# Patient Record
Sex: FEMALE | Race: WHITE
[De-identification: names, ages, dates, MRNs, and addresses within clinical notes are randomized per-mention and may not be internally consistent; named-entity substitution may affect disease eponyms.]

---

## 2017-08-24 ENCOUNTER — HOSPITAL ENCOUNTER (EMERGENCY)
Dept: HOSPITAL 54 - ER | Age: 52
Discharge: HOME | End: 2017-08-24
Payer: SELF-PAY

## 2017-08-24 VITALS — WEIGHT: 165 LBS | HEIGHT: 60 IN | BODY MASS INDEX: 32.39 KG/M2

## 2017-08-24 VITALS — DIASTOLIC BLOOD PRESSURE: 92 MMHG | SYSTOLIC BLOOD PRESSURE: 148 MMHG

## 2017-08-24 DIAGNOSIS — Z88.8: ICD-10-CM

## 2017-08-24 DIAGNOSIS — Z88.5: ICD-10-CM

## 2017-08-24 DIAGNOSIS — E11.9: ICD-10-CM

## 2017-08-24 DIAGNOSIS — I10: ICD-10-CM

## 2017-08-24 DIAGNOSIS — Z87.442: ICD-10-CM

## 2017-08-24 DIAGNOSIS — F17.200: ICD-10-CM

## 2017-08-24 DIAGNOSIS — L02.31: Primary | ICD-10-CM

## 2017-08-24 DIAGNOSIS — Z88.1: ICD-10-CM

## 2017-08-24 PROCEDURE — A4606 OXYGEN PROBE USED W OXIMETER: HCPCS

## 2017-08-24 PROCEDURE — Z7610: HCPCS

## 2017-08-24 PROCEDURE — A6407 PACKING STRIPS, NON-IMPREG: HCPCS

## 2017-08-24 PROCEDURE — A6402 STERILE GAUZE <= 16 SQ IN: HCPCS

## 2017-08-24 NOTE — NUR
PT AGREED TO TAKE PAIN MEDICATION [place]. PT IS AWARE SHE IS ALLERGIC TO 
MORPHINE / HYDROCODONE. RISK AND BENEFITS EXPLAINED X3. PT AGREES TO TAKE 
MEDICATION.

## 2017-08-24 NOTE — NUR
PT BIBSELF, C/O LEFT BUTTOCKS ABSCESS AND PAIN X 5 DAYS. PT AOX3 RR EVEN AND 
UNLABORED. NO SOB NOTED. NAD NOTED. NO NVD AT THIS TIME. PT GOWNED AND PLACED 
ON MONITOR. WAITING FOR MD WREN.

## 2017-08-24 NOTE — NUR
Patient discharged to home in stable condition. Written and verbal after care 
instructions given. Patient verbalizes understanding of instruction. Ambulatory 
with a steady gait

## 2018-08-24 ENCOUNTER — HOSPITAL ENCOUNTER (EMERGENCY)
Dept: HOSPITAL 54 - ER | Age: 53
Discharge: HOME | End: 2018-08-24
Payer: SELF-PAY

## 2018-08-24 ENCOUNTER — HOSPITAL ENCOUNTER (EMERGENCY)
Dept: HOSPITAL 54 - ER | Age: 53
Discharge: LEFT BEFORE DISCHARGE | End: 2018-08-24
Payer: SELF-PAY

## 2018-08-24 VITALS — HEIGHT: 63 IN | WEIGHT: 160 LBS | BODY MASS INDEX: 28.35 KG/M2

## 2018-08-24 VITALS — SYSTOLIC BLOOD PRESSURE: 134 MMHG | DIASTOLIC BLOOD PRESSURE: 77 MMHG

## 2018-08-24 VITALS — BODY MASS INDEX: 31.15 KG/M2 | HEIGHT: 61 IN | WEIGHT: 165 LBS

## 2018-08-24 VITALS — DIASTOLIC BLOOD PRESSURE: 97 MMHG | SYSTOLIC BLOOD PRESSURE: 136 MMHG

## 2018-08-24 DIAGNOSIS — Z53.21: ICD-10-CM

## 2018-08-24 DIAGNOSIS — Z87.442: ICD-10-CM

## 2018-08-24 DIAGNOSIS — M54.9: ICD-10-CM

## 2018-08-24 DIAGNOSIS — K76.0: ICD-10-CM

## 2018-08-24 DIAGNOSIS — I10: Primary | ICD-10-CM

## 2018-08-24 DIAGNOSIS — K80.20: ICD-10-CM

## 2018-08-24 DIAGNOSIS — E11.65: ICD-10-CM

## 2018-08-24 DIAGNOSIS — E11.9: ICD-10-CM

## 2018-08-24 DIAGNOSIS — G44.209: Primary | ICD-10-CM

## 2018-08-24 DIAGNOSIS — Z88.1: ICD-10-CM

## 2018-08-24 DIAGNOSIS — I10: ICD-10-CM

## 2018-08-24 DIAGNOSIS — F17.200: ICD-10-CM

## 2018-08-24 DIAGNOSIS — Z88.6: ICD-10-CM

## 2018-08-24 LAB
ALBUMIN SERPL BCP-MCNC: 3.9 G/DL (ref 3.4–5)
ALP SERPL-CCNC: 140 U/L (ref 46–116)
ALT SERPL W P-5'-P-CCNC: 32 U/L (ref 12–78)
AST SERPL W P-5'-P-CCNC: 10 U/L (ref 15–37)
BASOPHILS # BLD AUTO: 0.2 /CMM (ref 0–0.2)
BASOPHILS NFR BLD AUTO: 2 % (ref 0–2)
BILIRUB DIRECT SERPL-MCNC: 0 MG/DL (ref 0–0.2)
BILIRUB SERPL-MCNC: 0.2 MG/DL (ref 0.2–1)
BUN SERPL-MCNC: 24 MG/DL (ref 7–18)
CALCIUM SERPL-MCNC: 9.7 MG/DL (ref 8.5–10.1)
CHLORIDE SERPL-SCNC: 100 MMOL/L (ref 98–107)
CO2 SERPL-SCNC: 26 MMOL/L (ref 21–32)
CREAT SERPL-MCNC: 1.3 MG/DL (ref 0.6–1.3)
EOSINOPHIL NFR BLD AUTO: 2.3 % (ref 0–6)
GLUCOSE SERPL-MCNC: 370 MG/DL (ref 74–106)
HCT VFR BLD AUTO: 41 % (ref 33–45)
HGB BLD-MCNC: 12.8 G/DL (ref 11.5–14.8)
LIPASE SERPL-CCNC: 411 U/L (ref 73–393)
LYMPHOCYTES NFR BLD AUTO: 3.9 /CMM (ref 0.8–4.8)
LYMPHOCYTES NFR BLD AUTO: 38.6 % (ref 20–44)
MCH RBC QN AUTO: 24 PG (ref 26–33)
MCHC RBC AUTO-ENTMCNC: 32 G/DL (ref 31–36)
MCV RBC AUTO: 76 FL (ref 82–100)
MONOCYTES NFR BLD AUTO: 0.5 /CMM (ref 0.1–1.3)
MONOCYTES NFR BLD AUTO: 5.3 % (ref 2–12)
NEUTROPHILS # BLD AUTO: 5.3 /CMM (ref 1.8–8.9)
NEUTROPHILS NFR BLD AUTO: 51.8 % (ref 43–81)
PLATELET # BLD AUTO: 388 /CMM (ref 150–450)
POTASSIUM SERPL-SCNC: 4.3 MMOL/L (ref 3.5–5.1)
PROT SERPL-MCNC: 8.2 G/DL (ref 6.4–8.2)
RBC # BLD AUTO: 5.34 MIL/UL (ref 4–5.2)
RDW COEFFICIENT OF VARIATION: 14.9 (ref 11.5–15)
SODIUM SERPL-SCNC: 133 MMOL/L (ref 136–145)
WBC NRBC COR # BLD AUTO: 10.1 K/UL (ref 4.3–11)

## 2018-08-24 PROCEDURE — 85025 COMPLETE CBC W/AUTO DIFF WBC: CPT

## 2018-08-24 PROCEDURE — 93005 ELECTROCARDIOGRAM TRACING: CPT

## 2018-08-24 PROCEDURE — 96375 TX/PRO/DX INJ NEW DRUG ADDON: CPT

## 2018-08-24 PROCEDURE — A4606 OXYGEN PROBE USED W OXIMETER: HCPCS

## 2018-08-24 PROCEDURE — 96374 THER/PROPH/DIAG INJ IV PUSH: CPT

## 2018-08-24 PROCEDURE — 99285 EMERGENCY DEPT VISIT HI MDM: CPT

## 2018-08-24 PROCEDURE — 36415 COLL VENOUS BLD VENIPUNCTURE: CPT

## 2018-08-24 PROCEDURE — 82962 GLUCOSE BLOOD TEST: CPT

## 2018-08-24 PROCEDURE — 76705 ECHO EXAM OF ABDOMEN: CPT

## 2018-08-24 PROCEDURE — Z7610: HCPCS

## 2018-08-24 PROCEDURE — 83690 ASSAY OF LIPASE: CPT

## 2018-08-24 PROCEDURE — 80048 BASIC METABOLIC PNL TOTAL CA: CPT

## 2018-08-24 PROCEDURE — 80076 HEPATIC FUNCTION PANEL: CPT

## 2018-08-24 NOTE — NUR
PT AMBULATORY TO ER BED 3. BIBSELF C/O HIGH BLOOD PRESSURE AND HEADACHE. PT 
APPEARS VERY UPSET AND ANXIOUS. PT PLACED ON CARDIAC MONITOR. VSS/RESP EVEN 
UNLABORED/NAD NOTED/SKIN WARM AND DRY/AFEBRILE/DENIES N-V-D/AOX4. AWAITNG MD WREN.

## 2018-08-24 NOTE — NUR
IV removed. Catheter intact and site benign. Pressure and 4x4 applied to site. 
No bleeding noted. Patient discharged with daughter to home in stable 
condition. Written and verbal after care instructions given, patient instructed 
not to drive. Patient verbalizes understanding of instruction. Patient is awake 
and alert to self, day, and place. Patient ambulatory with a steady gait.

## 2018-08-24 NOTE — NUR
20G IV TO R AC X 1 ATTEMPT USING ASEPTIC TECH, BLOOD HANDED OVER TO THE LAB AT 
BEDSIDE. IV FLUSHES EASILY WITH NS, NO S/S INFILTRATION NOTED AT THIS TIME.

## 2019-11-09 ENCOUNTER — HOSPITAL ENCOUNTER (EMERGENCY)
Dept: HOSPITAL 54 - ER | Age: 54
Discharge: HOME | End: 2019-11-09
Payer: MEDICAID

## 2019-11-09 VITALS — HEIGHT: 63 IN | WEIGHT: 177 LBS | BODY MASS INDEX: 31.36 KG/M2

## 2019-11-09 VITALS — SYSTOLIC BLOOD PRESSURE: 124 MMHG | DIASTOLIC BLOOD PRESSURE: 67 MMHG

## 2019-11-09 DIAGNOSIS — Z88.6: ICD-10-CM

## 2019-11-09 DIAGNOSIS — F17.200: ICD-10-CM

## 2019-11-09 DIAGNOSIS — I10: ICD-10-CM

## 2019-11-09 DIAGNOSIS — Z88.8: ICD-10-CM

## 2019-11-09 DIAGNOSIS — J06.9: Primary | ICD-10-CM

## 2019-11-09 DIAGNOSIS — Z87.442: ICD-10-CM

## 2019-11-09 DIAGNOSIS — E11.65: ICD-10-CM

## 2019-11-09 LAB
BASOPHILS # BLD AUTO: 0.1 /CMM (ref 0–0.2)
BASOPHILS NFR BLD AUTO: 1.1 % (ref 0–2)
BUN SERPL-MCNC: 24 MG/DL (ref 7–18)
CALCIUM SERPL-MCNC: 9.1 MG/DL (ref 8.5–10.1)
CHLORIDE SERPL-SCNC: 101 MMOL/L (ref 98–107)
CO2 SERPL-SCNC: 24 MMOL/L (ref 21–32)
CREAT SERPL-MCNC: 1.2 MG/DL (ref 0.6–1.3)
EOSINOPHIL NFR BLD AUTO: 3.5 % (ref 0–6)
GLUCOSE SERPL-MCNC: 476 MG/DL (ref 74–106)
HCT VFR BLD AUTO: 38 % (ref 33–45)
HGB BLD-MCNC: 12.3 G/DL (ref 11.5–14.8)
LYMPHOCYTES NFR BLD AUTO: 3.1 /CMM (ref 0.8–4.8)
LYMPHOCYTES NFR BLD AUTO: 33.8 % (ref 20–44)
MCHC RBC AUTO-ENTMCNC: 32 G/DL (ref 31–36)
MCV RBC AUTO: 81 FL (ref 82–100)
MONOCYTES NFR BLD AUTO: 0.7 /CMM (ref 0.1–1.3)
MONOCYTES NFR BLD AUTO: 7.5 % (ref 2–12)
NEUTROPHILS # BLD AUTO: 5 /CMM (ref 1.8–8.9)
NEUTROPHILS NFR BLD AUTO: 54.1 % (ref 43–81)
PLATELET # BLD AUTO: 289 /CMM (ref 150–450)
POTASSIUM SERPL-SCNC: 4.2 MMOL/L (ref 3.5–5.1)
RBC # BLD AUTO: 4.71 MIL/UL (ref 4–5.2)
SODIUM SERPL-SCNC: 134 MMOL/L (ref 136–145)
WBC NRBC COR # BLD AUTO: 9.3 K/UL (ref 4.3–11)

## 2019-11-09 PROCEDURE — 93005 ELECTROCARDIOGRAM TRACING: CPT

## 2019-11-09 PROCEDURE — 96374 THER/PROPH/DIAG INJ IV PUSH: CPT

## 2019-11-09 PROCEDURE — 96372 THER/PROPH/DIAG INJ SC/IM: CPT

## 2019-11-09 PROCEDURE — 71045 X-RAY EXAM CHEST 1 VIEW: CPT

## 2019-11-09 PROCEDURE — 94640 AIRWAY INHALATION TREATMENT: CPT

## 2019-11-09 PROCEDURE — 36415 COLL VENOUS BLD VENIPUNCTURE: CPT

## 2019-11-09 PROCEDURE — 80048 BASIC METABOLIC PNL TOTAL CA: CPT

## 2019-11-09 PROCEDURE — 96375 TX/PRO/DX INJ NEW DRUG ADDON: CPT

## 2019-11-09 PROCEDURE — 84484 ASSAY OF TROPONIN QUANT: CPT

## 2019-11-09 PROCEDURE — 99284 EMERGENCY DEPT VISIT MOD MDM: CPT

## 2019-11-09 PROCEDURE — 85025 COMPLETE CBC W/AUTO DIFF WBC: CPT

## 2019-11-09 NOTE — NUR
RT



BREATHING TX GIVEN PER MD ORDER, VERIFIED WITH MD ORDER THAT DUPLICATE ORDERED WAS ENTERED, 
NON-ADMIN THE DUPLICATED ORDER.

## 2019-11-09 NOTE — NUR
PT CAME INTO THE ED C/O "I dont feel well/body aches/coughing/body aches 
xcouple days". PT ENDORSES CHEST PAIN 8/10 RADIATING TO THE BACK AND 
GENERALIZED BODY PAIN. PT AAOX4, VSS, BREATHING EVEN AND UNLABORED ON ROOM AIR 
W/ NAD. PT CONNECTED TO THE MONITOR

## 2020-08-16 ENCOUNTER — HOSPITAL ENCOUNTER (EMERGENCY)
Dept: HOSPITAL 12 - ER | Age: 55
LOS: 1 days | Discharge: HOME | End: 2020-08-17
Payer: MEDICAID

## 2020-08-16 VITALS — HEIGHT: 63 IN | BODY MASS INDEX: 31.01 KG/M2 | WEIGHT: 175 LBS

## 2020-08-16 DIAGNOSIS — Z79.4: ICD-10-CM

## 2020-08-16 DIAGNOSIS — Z88.0: ICD-10-CM

## 2020-08-16 DIAGNOSIS — Z79.82: ICD-10-CM

## 2020-08-16 DIAGNOSIS — G89.29: ICD-10-CM

## 2020-08-16 DIAGNOSIS — E11.9: ICD-10-CM

## 2020-08-16 DIAGNOSIS — Z79.899: ICD-10-CM

## 2020-08-16 DIAGNOSIS — I25.2: ICD-10-CM

## 2020-08-16 DIAGNOSIS — F17.200: ICD-10-CM

## 2020-08-16 DIAGNOSIS — I10: ICD-10-CM

## 2020-08-16 DIAGNOSIS — Z76.5: ICD-10-CM

## 2020-08-16 DIAGNOSIS — R07.9: Primary | ICD-10-CM

## 2020-08-16 DIAGNOSIS — Z88.6: ICD-10-CM

## 2020-08-16 DIAGNOSIS — E78.00: ICD-10-CM

## 2020-08-16 DIAGNOSIS — F43.9: ICD-10-CM

## 2020-08-16 LAB
ALP SERPL-CCNC: 137 U/L (ref 50–136)
ALT SERPL W/O P-5'-P-CCNC: 39 U/L (ref 14–59)
AST SERPL-CCNC: 13 U/L (ref 15–37)
BASOPHILS # BLD AUTO: 0 K/UL (ref 0–8)
BASOPHILS NFR BLD AUTO: 0.5 % (ref 0–2)
BILIRUB DIRECT SERPL-MCNC: 0.1 MG/DL (ref 0–0.2)
BILIRUB SERPL-MCNC: 0.6 MG/DL (ref 0.2–1)
BUN SERPL-MCNC: 21 MG/DL (ref 7–18)
CHLORIDE SERPL-SCNC: 102 MMOL/L (ref 98–107)
CO2 SERPL-SCNC: 23 MMOL/L (ref 21–32)
CREAT SERPL-MCNC: 1.7 MG/DL (ref 0.6–1.3)
EOSINOPHIL # BLD AUTO: 0.1 K/UL (ref 0–0.7)
EOSINOPHIL NFR BLD AUTO: 1.6 % (ref 0–7)
GLUCOSE SERPL-MCNC: 307 MG/DL (ref 74–106)
HCT VFR BLD AUTO: 42.6 % (ref 31.2–41.9)
HGB BLD-MCNC: 13.6 G/DL (ref 10.9–14.3)
LYMPHOCYTES # BLD AUTO: 3.6 K/UL (ref 20–40)
LYMPHOCYTES NFR BLD AUTO: 39 % (ref 20.5–51.5)
MCH RBC QN AUTO: 26.1 UUG (ref 24.7–32.8)
MCHC RBC AUTO-ENTMCNC: 32 G/DL (ref 32.3–35.6)
MCV RBC AUTO: 81.5 FL (ref 75.5–95.3)
MONOCYTES # BLD AUTO: 0.6 K/UL (ref 2–10)
MONOCYTES NFR BLD AUTO: 7 % (ref 0–11)
NEUTROPHILS # BLD AUTO: 4.8 K/UL (ref 1.8–8.9)
NEUTROPHILS NFR BLD AUTO: 51.9 % (ref 38.5–71.5)
PLATELET # BLD AUTO: 263 K/UL (ref 179–408)
POTASSIUM SERPL-SCNC: 4.1 MMOL/L (ref 3.5–5.1)
RBC # BLD AUTO: 5.23 MIL/UL (ref 3.63–4.92)
WBC # BLD AUTO: 9.2 K/UL (ref 3.8–11.8)
WS STN SPEC: 7.4 G/DL (ref 6.4–8.2)

## 2020-08-16 PROCEDURE — 83880 ASSAY OF NATRIURETIC PEPTIDE: CPT

## 2020-08-16 PROCEDURE — 70450 CT HEAD/BRAIN W/O DYE: CPT

## 2020-08-16 PROCEDURE — 36415 COLL VENOUS BLD VENIPUNCTURE: CPT

## 2020-08-16 PROCEDURE — 93005 ELECTROCARDIOGRAM TRACING: CPT

## 2020-08-16 PROCEDURE — A4663 DIALYSIS BLOOD PRESSURE CUFF: HCPCS

## 2020-08-16 PROCEDURE — 80076 HEPATIC FUNCTION PANEL: CPT

## 2020-08-16 PROCEDURE — 96375 TX/PRO/DX INJ NEW DRUG ADDON: CPT

## 2020-08-16 PROCEDURE — 82962 GLUCOSE BLOOD TEST: CPT

## 2020-08-16 PROCEDURE — 96374 THER/PROPH/DIAG INJ IV PUSH: CPT

## 2020-08-16 PROCEDURE — 71045 X-RAY EXAM CHEST 1 VIEW: CPT

## 2020-08-16 PROCEDURE — 99285 EMERGENCY DEPT VISIT HI MDM: CPT

## 2020-08-16 PROCEDURE — 85025 COMPLETE CBC W/AUTO DIFF WBC: CPT

## 2020-08-16 PROCEDURE — 84484 ASSAY OF TROPONIN QUANT: CPT

## 2020-08-16 PROCEDURE — 85379 FIBRIN DEGRADATION QUANT: CPT

## 2020-08-16 PROCEDURE — 80048 BASIC METABOLIC PNL TOTAL CA: CPT

## 2020-08-16 PROCEDURE — 85730 THROMBOPLASTIN TIME PARTIAL: CPT

## 2020-08-16 NOTE — NUR
Patient arrived on unit for chest pain as well pain in her head and eyes, EKG 
done and given to MD, IV place in right forearm 20 g, patient states pain level 
10/10

## 2020-08-16 NOTE — NUR
Patient noted resting in bed with eyes closed, no facial cues of pain at this 
time, no signs of distress noted

## 2020-08-17 VITALS — DIASTOLIC BLOOD PRESSURE: 72 MMHG | SYSTOLIC BLOOD PRESSURE: 114 MMHG

## 2020-08-17 NOTE — NUR
Patient discharged to home in stable condition. Noted ambulating in stable 
manner, daughter outside to drive her home. all belongings taken with patient. 
Rx given. Instructed not to drive while taking these medications. No signs of 
distress on discharge. Instructed to follow up with PCP. Written and verbal 
after care instructions given. Patient verbalizes understanding of 
instructions. Stressed follow up or return to ER for worsening s/s.

## 2020-08-17 NOTE — NUR
Patient returned from CT at this time, noted resting with eyes closed, no 
facial cues of pain, no signs of distress

## 2021-04-05 ENCOUNTER — HOSPITAL ENCOUNTER (EMERGENCY)
Dept: HOSPITAL 12 - ER | Age: 56
LOS: 1 days | Discharge: HOME | End: 2021-04-06
Payer: MEDICAID

## 2021-04-05 VITALS — BODY MASS INDEX: 30.12 KG/M2 | WEIGHT: 170 LBS | HEIGHT: 63 IN

## 2021-04-05 DIAGNOSIS — Z79.4: ICD-10-CM

## 2021-04-05 DIAGNOSIS — E87.1: ICD-10-CM

## 2021-04-05 DIAGNOSIS — R11.10: ICD-10-CM

## 2021-04-05 DIAGNOSIS — E11.65: ICD-10-CM

## 2021-04-05 DIAGNOSIS — R10.11: Primary | ICD-10-CM

## 2021-04-05 LAB
ALP SERPL-CCNC: 139 U/L (ref 50–136)
ALT SERPL W/O P-5'-P-CCNC: 22 U/L (ref 14–59)
AST SERPL-CCNC: 15 U/L (ref 15–37)
BASOPHILS # BLD AUTO: 0.1 K/UL (ref 0–8)
BASOPHILS NFR BLD AUTO: 0.8 % (ref 0–2)
BILIRUB DIRECT SERPL-MCNC: 0.1 MG/DL (ref 0–0.2)
BILIRUB SERPL-MCNC: 0.4 MG/DL (ref 0.2–1)
BUN SERPL-MCNC: 14 MG/DL (ref 7–18)
CHLORIDE SERPL-SCNC: 97 MMOL/L (ref 98–107)
CO2 SERPL-SCNC: 26 MMOL/L (ref 21–32)
CREAT SERPL-MCNC: 1.1 MG/DL (ref 0.6–1.3)
EOSINOPHIL # BLD AUTO: 0.4 K/UL (ref 0–0.7)
EOSINOPHIL NFR BLD AUTO: 5.2 % (ref 0–7)
GLUCOSE SERPL-MCNC: 212 MG/DL (ref 74–106)
HCT VFR BLD AUTO: 41.9 % (ref 31.2–41.9)
HGB BLD-MCNC: 13.6 G/DL (ref 10.9–14.3)
LIPASE SERPL-CCNC: 172 U/L (ref 73–393)
LYMPHOCYTES # BLD AUTO: 3.1 K/UL (ref 20–40)
LYMPHOCYTES NFR BLD AUTO: 42.1 % (ref 20.5–51.5)
MCH RBC QN AUTO: 25.9 UUG (ref 24.7–32.8)
MCHC RBC AUTO-ENTMCNC: 33 G/DL (ref 32.3–35.6)
MCV RBC AUTO: 79.6 FL (ref 75.5–95.3)
MONOCYTES # BLD AUTO: 0.5 K/UL (ref 2–10)
MONOCYTES NFR BLD AUTO: 6.1 % (ref 0–11)
NEUTROPHILS # BLD AUTO: 3.4 K/UL (ref 1.8–8.9)
NEUTROPHILS NFR BLD AUTO: 45.8 % (ref 38.5–71.5)
PLATELET # BLD AUTO: 351 K/UL (ref 179–408)
POTASSIUM SERPL-SCNC: 4.3 MMOL/L (ref 3.5–5.1)
RBC # BLD AUTO: 5.26 MIL/UL (ref 3.63–4.92)
WBC # BLD AUTO: 7.4 K/UL (ref 3.8–11.8)
WS STN SPEC: 8.4 G/DL (ref 6.4–8.2)

## 2021-04-05 PROCEDURE — 96361 HYDRATE IV INFUSION ADD-ON: CPT

## 2021-04-05 PROCEDURE — 93005 ELECTROCARDIOGRAM TRACING: CPT

## 2021-04-05 PROCEDURE — 74177 CT ABD & PELVIS W/CONTRAST: CPT

## 2021-04-05 PROCEDURE — 96375 TX/PRO/DX INJ NEW DRUG ADDON: CPT

## 2021-04-05 PROCEDURE — 96376 TX/PRO/DX INJ SAME DRUG ADON: CPT

## 2021-04-05 PROCEDURE — 99285 EMERGENCY DEPT VISIT HI MDM: CPT

## 2021-04-05 PROCEDURE — 83690 ASSAY OF LIPASE: CPT

## 2021-04-05 PROCEDURE — 80076 HEPATIC FUNCTION PANEL: CPT

## 2021-04-05 PROCEDURE — C9113 INJ PANTOPRAZOLE SODIUM, VIA: HCPCS

## 2021-04-05 PROCEDURE — 76705 ECHO EXAM OF ABDOMEN: CPT

## 2021-04-05 PROCEDURE — 85025 COMPLETE CBC W/AUTO DIFF WBC: CPT

## 2021-04-05 PROCEDURE — 96374 THER/PROPH/DIAG INJ IV PUSH: CPT

## 2021-04-05 PROCEDURE — 83605 ASSAY OF LACTIC ACID: CPT

## 2021-04-05 PROCEDURE — 80048 BASIC METABOLIC PNL TOTAL CA: CPT

## 2021-04-06 VITALS — SYSTOLIC BLOOD PRESSURE: 150 MMHG | DIASTOLIC BLOOD PRESSURE: 94 MMHG

## 2021-04-06 NOTE — NUR
IV DISCONTINUED, CATHETER INTACT.

Patient discharged to home in stable condition.  Written and verbal after care 
instructions given. 

Patient verbalizes understanding of instructions. Stressed follow up or return 
to ER for worsening s/s.

## 2021-04-23 ENCOUNTER — HOSPITAL ENCOUNTER (EMERGENCY)
Dept: HOSPITAL 12 - ER | Age: 56
Discharge: HOME | End: 2021-04-23
Payer: COMMERCIAL

## 2021-04-23 VITALS — HEIGHT: 63 IN | WEIGHT: 170 LBS | BODY MASS INDEX: 30.12 KG/M2

## 2021-04-23 VITALS — SYSTOLIC BLOOD PRESSURE: 161 MMHG | DIASTOLIC BLOOD PRESSURE: 111 MMHG

## 2021-04-23 DIAGNOSIS — R07.89: ICD-10-CM

## 2021-04-23 DIAGNOSIS — Z88.5: ICD-10-CM

## 2021-04-23 DIAGNOSIS — K21.9: ICD-10-CM

## 2021-04-23 DIAGNOSIS — E11.65: ICD-10-CM

## 2021-04-23 DIAGNOSIS — L02.211: Primary | ICD-10-CM

## 2021-04-23 DIAGNOSIS — Z79.4: ICD-10-CM

## 2021-04-23 DIAGNOSIS — J45.909: ICD-10-CM

## 2021-04-23 DIAGNOSIS — F17.200: ICD-10-CM

## 2021-04-23 DIAGNOSIS — Z88.0: ICD-10-CM

## 2021-04-23 DIAGNOSIS — R94.31: ICD-10-CM

## 2021-04-23 DIAGNOSIS — E78.00: ICD-10-CM

## 2021-04-23 LAB
ALP SERPL-CCNC: 151 U/L (ref 50–136)
ALT SERPL W/O P-5'-P-CCNC: 30 U/L (ref 14–59)
APPEARANCE UR: CLEAR
AST SERPL-CCNC: 13 U/L (ref 15–37)
BASOPHILS # BLD AUTO: 0.1 K/UL (ref 0–8)
BASOPHILS NFR BLD AUTO: 1.4 % (ref 0–2)
BILIRUB DIRECT SERPL-MCNC: 0.1 MG/DL (ref 0–0.2)
BILIRUB SERPL-MCNC: 0.3 MG/DL (ref 0.2–1)
BILIRUB UR QL STRIP: NEGATIVE
BUN SERPL-MCNC: 19 MG/DL (ref 7–18)
CHLORIDE SERPL-SCNC: 96 MMOL/L (ref 98–107)
CO2 SERPL-SCNC: 28 MMOL/L (ref 21–32)
COLOR UR: (no result)
CREAT SERPL-MCNC: 1.2 MG/DL (ref 0.6–1.3)
DEPRECATED SQUAMOUS URNS QL MICRO: (no result) /HPF
EOSINOPHIL # BLD AUTO: 0.4 K/UL (ref 0–0.7)
EOSINOPHIL NFR BLD AUTO: 4.2 % (ref 0–7)
GLUCOSE SERPL-MCNC: 601 MG/DL (ref 74–106)
GLUCOSE UR STRIP-MCNC: (no result) MG/DL
HCT VFR BLD AUTO: 39.1 % (ref 31.2–41.9)
HGB BLD-MCNC: 12.6 G/DL (ref 10.9–14.3)
HGB UR QL STRIP: (no result)
KETONES UR STRIP-MCNC: NEGATIVE MG/DL
LEUKOCYTE ESTERASE UR QL STRIP: (no result)
LYMPHOCYTES # BLD AUTO: 3 K/UL (ref 20–40)
LYMPHOCYTES NFR BLD AUTO: 29.4 % (ref 20.5–51.5)
MAGNESIUM SERPL-MCNC: 1.8 MG/DL (ref 1.8–2.4)
MCH RBC QN AUTO: 26.2 UUG (ref 24.7–32.8)
MCHC RBC AUTO-ENTMCNC: 32 G/DL (ref 32.3–35.6)
MCV RBC AUTO: 81.5 FL (ref 75.5–95.3)
MONOCYTES # BLD AUTO: 0.7 K/UL (ref 2–10)
MONOCYTES NFR BLD AUTO: 6.6 % (ref 0–11)
NEUTROPHILS # BLD AUTO: 6 K/UL (ref 1.8–8.9)
NEUTROPHILS NFR BLD AUTO: 58.4 % (ref 38.5–71.5)
NITRITE UR QL STRIP: NEGATIVE
PH UR STRIP: 7 [PH] (ref 5–8)
PLATELET # BLD AUTO: 248 K/UL (ref 179–408)
POTASSIUM SERPL-SCNC: 5 MMOL/L (ref 3.5–5.1)
RBC # BLD AUTO: 4.79 MIL/UL (ref 3.63–4.92)
RBC #/AREA URNS HPF: (no result) /HPF (ref 0–3)
SP GR UR STRIP: 1.01 (ref 1–1.03)
UROBILINOGEN UR STRIP-MCNC: 0.2 E.U./DL
WBC # BLD AUTO: 10.3 K/UL (ref 3.8–11.8)
WBC #/AREA URNS HPF: (no result) /HPF
WBC #/AREA URNS HPF: (no result) /HPF (ref 0–3)
WS STN SPEC: 7.4 G/DL (ref 6.4–8.2)

## 2021-04-23 PROCEDURE — 87077 CULTURE AEROBIC IDENTIFY: CPT

## 2021-04-23 PROCEDURE — 10060 I&D ABSCESS SIMPLE/SINGLE: CPT

## 2021-04-23 PROCEDURE — 36415 COLL VENOUS BLD VENIPUNCTURE: CPT

## 2021-04-23 PROCEDURE — 96365 THER/PROPH/DIAG IV INF INIT: CPT

## 2021-04-23 PROCEDURE — 85025 COMPLETE CBC W/AUTO DIFF WBC: CPT

## 2021-04-23 PROCEDURE — 87070 CULTURE OTHR SPECIMN AEROBIC: CPT

## 2021-04-23 PROCEDURE — 83735 ASSAY OF MAGNESIUM: CPT

## 2021-04-23 PROCEDURE — 96361 HYDRATE IV INFUSION ADD-ON: CPT

## 2021-04-23 PROCEDURE — 99285 EMERGENCY DEPT VISIT HI MDM: CPT

## 2021-04-23 PROCEDURE — A4663 DIALYSIS BLOOD PRESSURE CUFF: HCPCS

## 2021-04-23 PROCEDURE — 93005 ELECTROCARDIOGRAM TRACING: CPT

## 2021-04-23 PROCEDURE — 87086 URINE CULTURE/COLONY COUNT: CPT

## 2021-04-23 PROCEDURE — 80048 BASIC METABOLIC PNL TOTAL CA: CPT

## 2021-04-23 PROCEDURE — 82962 GLUCOSE BLOOD TEST: CPT

## 2021-04-23 PROCEDURE — 87040 BLOOD CULTURE FOR BACTERIA: CPT

## 2021-04-23 PROCEDURE — 82009 KETONE BODYS QUAL: CPT

## 2021-04-23 PROCEDURE — 71045 X-RAY EXAM CHEST 1 VIEW: CPT

## 2021-04-23 PROCEDURE — 84484 ASSAY OF TROPONIN QUANT: CPT

## 2021-04-23 PROCEDURE — 80076 HEPATIC FUNCTION PANEL: CPT

## 2021-04-23 PROCEDURE — 83880 ASSAY OF NATRIURETIC PEPTIDE: CPT

## 2021-04-23 PROCEDURE — 81001 URINALYSIS AUTO W/SCOPE: CPT

## 2021-04-23 RX ADMIN — ASPIRIN ONE MG: 81 TABLET, CHEWABLE ORAL at 02:20

## 2021-04-23 RX ADMIN — SODIUM CHLORIDE ONE ML: 0.9 INJECTION, SOLUTION INTRAVENOUS at 03:50

## 2021-04-23 RX ADMIN — SODIUM CHLORIDE ONE ML: 0.9 INJECTION, SOLUTION INTRAVENOUS at 02:17

## 2021-04-23 RX ADMIN — LIDOCAINE HYDROCHLORIDE AND EPINEPHRINE ONE ML: 20; 10 INJECTION, SOLUTION INFILTRATION; PERINEURAL at 02:17

## 2021-04-23 RX ADMIN — OXYCODONE HYDROCHLORIDE AND ACETAMINOPHEN ONE TAB: 5; 325 TABLET ORAL at 03:00

## 2021-04-23 RX ADMIN — OXYCODONE HYDROCHLORIDE AND ACETAMINOPHEN ONE TAB: 5; 325 TABLET ORAL at 02:49

## 2021-04-23 RX ADMIN — PIPERACILLIN SODIUM AND TAZOBACTAM SODIUM ONE MLS/HR: .375; 3 INJECTION, POWDER, LYOPHILIZED, FOR SOLUTION INTRAVENOUS at 02:49

## 2021-04-23 RX ADMIN — SODIUM CHLORIDE ONE ML: 0.9 INJECTION, SOLUTION INTRAVENOUS at 02:30

## 2021-04-23 NOTE — NUR
Dr. Lal completed a lancing of the cyst on the lower left abdomen. 
Minimal blood loss through the procedure. Wound packed with iodoform packing 
and wound cleaned with chlorhexadine and dressed with 4x4 gauze and taped with 
paper tape. Patient tolerated procedure without complications or discomfort.

## 2021-04-23 NOTE — NUR
Patient came in to the ER with her daughter complaining of chest pain, high 
blood sugar, and a draining cyst. Patient is A/O x3 and seems to be concerned 
over her condition. VS are stable, blood sugar reads "HI" on the accucheck 
indicitave of a BS possibly over 600. Sinus rhythm on the monitor.  The cyst is 
located on her left lower abdomen within the abdominal fold, it is open and 
weaping fluid.

## 2021-04-23 NOTE — NUR
Patient discharged to home in stable condition.  Written and verbal wound care 
instructions given. 

Patient verbalizes understanding of instructions. Stressed follow up or return 
to ER for worsening s/s. Patient discharged without SOB or signs of distress. 
Dr. Lal deemed third liter of normal saline not necessary due to the 
improvint condition of the patient, and cleared he patient for discharge.

## 2022-03-09 ENCOUNTER — HOSPITAL ENCOUNTER (EMERGENCY)
Dept: HOSPITAL 12 - ER | Age: 57
Discharge: HOME | End: 2022-03-09
Payer: COMMERCIAL

## 2022-03-09 VITALS — DIASTOLIC BLOOD PRESSURE: 92 MMHG | SYSTOLIC BLOOD PRESSURE: 150 MMHG

## 2022-03-09 VITALS — WEIGHT: 180 LBS | HEIGHT: 63 IN | BODY MASS INDEX: 31.89 KG/M2

## 2022-03-09 DIAGNOSIS — L02.215: Primary | ICD-10-CM

## 2022-03-09 DIAGNOSIS — J45.909: ICD-10-CM

## 2022-03-09 DIAGNOSIS — E11.42: ICD-10-CM

## 2022-03-09 DIAGNOSIS — Z88.6: ICD-10-CM

## 2022-03-09 DIAGNOSIS — R03.0: ICD-10-CM

## 2022-03-09 DIAGNOSIS — E78.00: ICD-10-CM

## 2022-03-09 DIAGNOSIS — Z79.4: ICD-10-CM

## 2022-03-09 DIAGNOSIS — F17.200: ICD-10-CM

## 2022-03-09 DIAGNOSIS — Z79.899: ICD-10-CM

## 2022-03-09 DIAGNOSIS — Z88.0: ICD-10-CM

## 2022-03-09 DIAGNOSIS — K21.9: ICD-10-CM

## 2022-03-09 PROCEDURE — A4663 DIALYSIS BLOOD PRESSURE CUFF: HCPCS

## 2022-03-09 PROCEDURE — 56405 I&D VULVA/PERINEAL ABSCESS: CPT

## 2022-03-09 PROCEDURE — 99283 EMERGENCY DEPT VISIT LOW MDM: CPT

## 2022-03-09 RX ADMIN — LIDOCAINE HYDROCHLORIDE AND EPINEPHRINE ONE ML: 10; 10 INJECTION, SOLUTION INFILTRATION; PERINEURAL at 20:00

## 2022-03-11 ENCOUNTER — HOSPITAL ENCOUNTER (EMERGENCY)
Dept: HOSPITAL 12 - ER | Age: 57
Discharge: HOME | End: 2022-03-11
Payer: COMMERCIAL

## 2022-03-11 VITALS — WEIGHT: 180 LBS | BODY MASS INDEX: 31.89 KG/M2 | HEIGHT: 63 IN

## 2022-03-11 VITALS — DIASTOLIC BLOOD PRESSURE: 86 MMHG | SYSTOLIC BLOOD PRESSURE: 160 MMHG

## 2022-03-11 DIAGNOSIS — Z79.4: ICD-10-CM

## 2022-03-11 DIAGNOSIS — Z88.1: ICD-10-CM

## 2022-03-11 DIAGNOSIS — J45.909: Primary | ICD-10-CM

## 2022-03-11 DIAGNOSIS — L02.215: ICD-10-CM

## 2022-03-11 DIAGNOSIS — Z88.6: ICD-10-CM

## 2022-03-11 DIAGNOSIS — E11.9: ICD-10-CM

## 2022-03-11 DIAGNOSIS — F17.210: ICD-10-CM

## 2022-03-11 PROCEDURE — A4663 DIALYSIS BLOOD PRESSURE CUFF: HCPCS

## 2022-03-11 NOTE — NUR
Patient discharged to home in stable condition.  Written and verbal after care 
instructions given. 

Patient verbalizes understanding of instructions. Stressed follow up or return 
to ER for worsening s/s. No changes in LOC. Wound care completed, covered in 
clean, dry dressing.

## 2022-03-11 NOTE — NUR
Pt came to ER for abcess check in pelvic region. Able to ambulate without 
assistance. Speech clear, speaks in complete sentences. Denies SOB or chest 
pain.

## 2022-03-13 ENCOUNTER — HOSPITAL ENCOUNTER (EMERGENCY)
Dept: HOSPITAL 12 - ER | Age: 57
Discharge: HOME | End: 2022-03-13
Payer: COMMERCIAL

## 2022-03-13 VITALS — BODY MASS INDEX: 31.89 KG/M2 | WEIGHT: 180 LBS | HEIGHT: 63 IN

## 2022-03-13 VITALS — SYSTOLIC BLOOD PRESSURE: 142 MMHG | DIASTOLIC BLOOD PRESSURE: 76 MMHG

## 2022-03-13 DIAGNOSIS — L02.215: ICD-10-CM

## 2022-03-13 DIAGNOSIS — F17.210: ICD-10-CM

## 2022-03-13 DIAGNOSIS — E78.00: ICD-10-CM

## 2022-03-13 DIAGNOSIS — Z88.1: ICD-10-CM

## 2022-03-13 DIAGNOSIS — Z88.6: ICD-10-CM

## 2022-03-13 DIAGNOSIS — Z79.899: ICD-10-CM

## 2022-03-13 DIAGNOSIS — Z79.4: ICD-10-CM

## 2022-03-13 DIAGNOSIS — J98.01: Primary | ICD-10-CM

## 2022-03-13 DIAGNOSIS — E11.9: ICD-10-CM

## 2022-03-13 PROCEDURE — A4663 DIALYSIS BLOOD PRESSURE CUFF: HCPCS

## 2022-03-13 NOTE — NUR
asssumed care of pt, who c/o increased work of breathing after inhaling "dust". 
reports discoloured sputum and productive cough. pt on room air, speaking in 
full sentences. pt evaluated by MD and RN, treatment ordered, RT notified. 
placed on continuous pulse oximetry. pt is alert and oriented, NAD noted

## 2022-03-13 NOTE — NUR
breathing TX completed, pt reports substantial improvement in breathing after 
administration by RT of MD ordered medications, continues to speakin full 
sentences, without obvious distress while on room air. MD to bedside to 
reevaluate pt. pt would like abcess evaluated, MD performed in the prescence of 
female chaperion

## 2022-03-13 NOTE — NUR
Patient discharged to home in stable condition.  MD discussed, at length with 
pt and provided written  after care instructions, including resources to better 
manage her diabetes, as well as ACI for her primary compliant of lung 
discomfort. MD and RN stressed importance of follow up after visit with PMD and 
when to return to ER for worsening S/S. pt verbalised understanding of all 
instructions given and is able to identify in written instruction where to find 
information provided. pt ambulated out of dept with steady gait under her own 
power, NAD noted

## 2022-06-04 ENCOUNTER — HOSPITAL ENCOUNTER (EMERGENCY)
Dept: HOSPITAL 12 - ER | Age: 57
LOS: 1 days | Discharge: LEFT BEFORE DISCHARGE | End: 2022-06-05
Payer: COMMERCIAL

## 2022-06-04 VITALS — BODY MASS INDEX: 31.89 KG/M2 | HEIGHT: 63 IN | WEIGHT: 180 LBS

## 2022-06-04 DIAGNOSIS — B00.1: ICD-10-CM

## 2022-06-04 DIAGNOSIS — Z79.899: ICD-10-CM

## 2022-06-04 DIAGNOSIS — I10: ICD-10-CM

## 2022-06-04 DIAGNOSIS — E11.65: ICD-10-CM

## 2022-06-04 DIAGNOSIS — R07.81: ICD-10-CM

## 2022-06-04 DIAGNOSIS — E66.01: ICD-10-CM

## 2022-06-04 DIAGNOSIS — Z79.4: ICD-10-CM

## 2022-06-04 DIAGNOSIS — K80.20: ICD-10-CM

## 2022-06-04 DIAGNOSIS — Z53.29: ICD-10-CM

## 2022-06-04 DIAGNOSIS — R60.0: Primary | ICD-10-CM

## 2022-06-04 DIAGNOSIS — E78.00: ICD-10-CM

## 2022-06-04 DIAGNOSIS — Z79.82: ICD-10-CM

## 2022-06-04 DIAGNOSIS — F17.210: ICD-10-CM

## 2022-06-04 DIAGNOSIS — Z20.822: ICD-10-CM

## 2022-06-04 LAB
ALP SERPL-CCNC: 136 U/L (ref 50–136)
ALT SERPL W/O P-5'-P-CCNC: 26 U/L (ref 14–59)
AST SERPL-CCNC: 13 U/L (ref 15–37)
BILIRUB SERPL-MCNC: 0.3 MG/DL (ref 0.2–1)
BUN SERPL-MCNC: 14 MG/DL (ref 7–18)
CHLORIDE SERPL-SCNC: 100 MMOL/L (ref 98–107)
CO2 SERPL-SCNC: 26 MMOL/L (ref 21–32)
CREAT SERPL-MCNC: 1.2 MG/DL (ref 0.6–1.3)
GLUCOSE SERPL-MCNC: 443 MG/DL (ref 74–106)
HCT VFR BLD AUTO: 41.6 % (ref 31.2–41.9)
MCH RBC QN AUTO: 27.9 UUG (ref 24.7–32.8)
MCV RBC AUTO: 83.9 FL (ref 75.5–95.3)
PLATELET # BLD AUTO: 228 K/UL (ref 179–408)
POTASSIUM SERPL-SCNC: 4.2 MMOL/L (ref 3.5–5.1)
WS STN SPEC: 6.6 G/DL (ref 6.4–8.2)

## 2022-06-04 PROCEDURE — 36415 COLL VENOUS BLD VENIPUNCTURE: CPT

## 2022-06-04 PROCEDURE — 85025 COMPLETE CBC W/AUTO DIFF WBC: CPT

## 2022-06-04 PROCEDURE — 84484 ASSAY OF TROPONIN QUANT: CPT

## 2022-06-04 PROCEDURE — 82009 KETONE BODYS QUAL: CPT

## 2022-06-04 PROCEDURE — 87426 SARSCOV CORONAVIRUS AG IA: CPT

## 2022-06-04 PROCEDURE — 96372 THER/PROPH/DIAG INJ SC/IM: CPT

## 2022-06-04 PROCEDURE — 71111 X-RAY EXAM RIBS/CHEST4/> VWS: CPT

## 2022-06-04 PROCEDURE — 93005 ELECTROCARDIOGRAM TRACING: CPT

## 2022-06-04 PROCEDURE — 99285 EMERGENCY DEPT VISIT HI MDM: CPT

## 2022-06-04 PROCEDURE — 80053 COMPREHEN METABOLIC PANEL: CPT

## 2022-06-04 PROCEDURE — 76705 ECHO EXAM OF ABDOMEN: CPT

## 2022-06-05 VITALS — SYSTOLIC BLOOD PRESSURE: 148 MMHG | DIASTOLIC BLOOD PRESSURE: 90 MMHG

## 2022-06-05 NOTE — NUR
Patient does not wish to proceed with medical care recommended by Dr. Staley.  
Patient given information related to possible complications, up to and 
including death, which could occur as a result of leaving the hospital at this 
time.  Patient verbalizes understanding of risks involved due to leaving 
against medical advice.  Patient has signed AMA form.

## 2023-06-10 ENCOUNTER — HOSPITAL ENCOUNTER (EMERGENCY)
Dept: HOSPITAL 54 - ER | Age: 58
Discharge: HOME | End: 2023-06-10
Payer: MEDICAID

## 2023-06-10 VITALS — HEIGHT: 63 IN | BODY MASS INDEX: 35.44 KG/M2 | WEIGHT: 200 LBS

## 2023-06-10 VITALS — SYSTOLIC BLOOD PRESSURE: 142 MMHG | DIASTOLIC BLOOD PRESSURE: 89 MMHG
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
## 2023-06-10 DIAGNOSIS — F17.200: ICD-10-CM

## 2023-06-10 DIAGNOSIS — Z87.442: ICD-10-CM

## 2023-06-10 DIAGNOSIS — I10: ICD-10-CM

## 2023-06-10 DIAGNOSIS — E11.9: ICD-10-CM
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
## 2023-06-10 DIAGNOSIS — Z88.8: ICD-10-CM
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
## 2023-06-10 DIAGNOSIS — S70.02XA: Primary | ICD-10-CM

## 2023-06-10 DIAGNOSIS — S09.90XA: ICD-10-CM
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
## 2023-06-10 PROCEDURE — 96372 THER/PROPH/DIAG INJ SC/IM: CPT

## 2023-06-10 PROCEDURE — 72192 CT PELVIS W/O DYE: CPT

## 2023-06-10 PROCEDURE — 70450 CT HEAD/BRAIN W/O DYE: CPT

## 2023-06-10 PROCEDURE — 99285 EMERGENCY DEPT VISIT HI MDM: CPT

## 2023-06-10 PROCEDURE — 93005 ELECTROCARDIOGRAM TRACING: CPT

## 2023-06-10 PROCEDURE — 72125 CT NECK SPINE W/O DYE: CPT

## 2024-02-29 ENCOUNTER — HOSPITAL ENCOUNTER (EMERGENCY)
Dept: HOSPITAL 12 - ER | Age: 59
Discharge: LEFT BEFORE DISCHARGE | End: 2024-02-29
Payer: MEDICAID

## 2024-02-29 VITALS — DIASTOLIC BLOOD PRESSURE: 86 MMHG | OXYGEN SATURATION: 100 % | SYSTOLIC BLOOD PRESSURE: 157 MMHG

## 2024-02-29 VITALS — HEIGHT: 63 IN | BODY MASS INDEX: 28.88 KG/M2 | WEIGHT: 163 LBS

## 2024-02-29 DIAGNOSIS — J45.909: ICD-10-CM

## 2024-02-29 DIAGNOSIS — E11.9: ICD-10-CM

## 2024-02-29 DIAGNOSIS — Z79.899: ICD-10-CM

## 2024-02-29 DIAGNOSIS — N61.1: Primary | ICD-10-CM

## 2024-02-29 DIAGNOSIS — Z88.1: ICD-10-CM

## 2024-02-29 DIAGNOSIS — F17.200: ICD-10-CM

## 2024-02-29 DIAGNOSIS — E78.00: ICD-10-CM

## 2024-02-29 LAB
ALBUMIN SERPL BCG-MCNC: 3.2 G/DL (ref 3.4–5)
ALP SERPL-CCNC: 177 U/L (ref 50–136)
ALT SERPL W/O P-5'-P-CCNC: 17 U/L (ref 14–59)
AST SERPL-CCNC: 12 U/L (ref 15–37)
BASOPHILS # BLD AUTO: 0.2 K/UL (ref 0–0.2)
BASOPHILS NFR BLD AUTO: 3.6 % (ref 0–2)
BILIRUB SERPL-MCNC: 0.6 MG/DL (ref 0.2–1)
BUN SERPL-MCNC: 21 MG/DL (ref 7–18)
CALCIUM SERPL-MCNC: 9.6 MG/DL (ref 8.5–10.1)
CHLORIDE SERPL-SCNC: 97 MMOL/L (ref 98–107)
CO2 SERPL-SCNC: 25 MMOL/L (ref 21–32)
CREAT SERPL-MCNC: 1.5 MG/DL (ref 0.6–1.3)
EOSINOPHIL # BLD AUTO: 1 K/UL (ref 0–0.7)
EOSINOPHIL NFR BLD AUTO: 16.2 % (ref 0–7)
ERYTHROCYTE [DISTWIDTH] IN BLOOD BY AUTOMATED COUNT: 13.3 % (ref 12.3–17.7)
GLUCOSE SERPL-MCNC: 91 MG/DL (ref 74–106)
HCT VFR BLD AUTO: 37.8 % (ref 31.2–41.9)
HGB BLD-MCNC: 12.5 G/DL (ref 10.9–14.3)
LYMPHOCYTES # BLD AUTO: 0.9 K/UL (ref 0.8–4.8)
LYMPHOCYTES NFR BLD AUTO: 14.7 % (ref 20.5–51.5)
MANUAL DIF COMMENT BLD-IMP: 1
MCH RBC QN AUTO: 27.3 UUG (ref 24.7–32.8)
MCHC RBC AUTO-ENTMCNC: 33 G/DL (ref 32.3–35.6)
MCV RBC AUTO: 82.6 FL (ref 75.5–95.3)
MONOCYTES # BLD AUTO: 0.3 K/UL (ref 0.1–1.3)
MONOCYTES NFR BLD AUTO: 4.4 % (ref 0–11)
NEUTROPHILS # BLD AUTO: 3.9 K/UL (ref 1.8–8.9)
NEUTROPHILS NFR BLD AUTO: 61.1 % (ref 38.5–71.5)
PLATELET # BLD AUTO: 319 K/UL (ref 179–408)
POTASSIUM SERPL-SCNC: 4.3 MMOL/L (ref 3.5–5.1)
RBC # BLD AUTO: 4.57 MIL/UL (ref 3.63–4.92)
SODIUM SERPL-SCNC: 133 MMOL/L (ref 136–145)
WBC # BLD AUTO: 6.4 K/UL (ref 3.8–11.8)
WS STN SPEC: 7.9 G/DL (ref 6.4–8.2)

## 2024-02-29 PROCEDURE — 80053 COMPREHEN METABOLIC PANEL: CPT

## 2024-02-29 PROCEDURE — 36415 COLL VENOUS BLD VENIPUNCTURE: CPT

## 2024-02-29 PROCEDURE — 85025 COMPLETE CBC W/AUTO DIFF WBC: CPT

## 2024-02-29 PROCEDURE — A4663 DIALYSIS BLOOD PRESSURE CUFF: HCPCS

## 2024-02-29 PROCEDURE — 96372 THER/PROPH/DIAG INJ SC/IM: CPT

## 2024-02-29 PROCEDURE — 83605 ASSAY OF LACTIC ACID: CPT

## 2024-02-29 PROCEDURE — 99284 EMERGENCY DEPT VISIT MOD MDM: CPT

## 2024-02-29 PROCEDURE — 87040 BLOOD CULTURE FOR BACTERIA: CPT

## 2024-02-29 PROCEDURE — A4606 OXYGEN PROBE USED W OXIMETER: HCPCS

## 2024-02-29 RX ADMIN — DEXTROSE ONE G: 50 INJECTION, SOLUTION INTRAVENOUS at 21:26

## 2024-02-29 RX ADMIN — CLINDAMYCIN HYDROCHLORIDE ONE MG: 150 CAPSULE ORAL at 21:03

## 2024-02-29 RX ADMIN — Medication ONE MG: at 23:00

## 2024-02-29 RX ADMIN — KETOROLAC TROMETHAMINE ONE MG: 15 INJECTION, SOLUTION INTRAMUSCULAR; INTRAVENOUS at 21:26

## 2024-02-29 RX ADMIN — IBUPROFEN ONE MG: 600 TABLET, FILM COATED ORAL at 20:58

## 2025-02-21 ENCOUNTER — HOSPITAL ENCOUNTER (EMERGENCY)
Dept: HOSPITAL 12 - ER | Age: 60
Discharge: HOME | End: 2025-02-21
Payer: MEDICAID

## 2025-02-21 VITALS — WEIGHT: 140 LBS | BODY MASS INDEX: 24.8 KG/M2 | HEIGHT: 63 IN

## 2025-02-21 VITALS — OXYGEN SATURATION: 99 %

## 2025-02-21 DIAGNOSIS — E11.9: ICD-10-CM

## 2025-02-21 DIAGNOSIS — Z88.7: ICD-10-CM

## 2025-02-21 DIAGNOSIS — Z79.624: ICD-10-CM

## 2025-02-21 DIAGNOSIS — K21.9: ICD-10-CM

## 2025-02-21 DIAGNOSIS — Z79.899: ICD-10-CM

## 2025-02-21 DIAGNOSIS — E78.00: ICD-10-CM

## 2025-02-21 DIAGNOSIS — H60.12: Primary | ICD-10-CM

## 2025-02-21 DIAGNOSIS — F17.200: ICD-10-CM

## 2025-02-21 DIAGNOSIS — Z79.4: ICD-10-CM

## 2025-02-21 DIAGNOSIS — Z88.5: ICD-10-CM

## 2025-02-21 DIAGNOSIS — Z98.890: ICD-10-CM

## 2025-02-21 DIAGNOSIS — Z79.82: ICD-10-CM

## 2025-02-21 DIAGNOSIS — R60.9: ICD-10-CM

## 2025-02-21 PROCEDURE — 90715 TDAP VACCINE 7 YRS/> IM: CPT

## 2025-02-21 PROCEDURE — 99284 EMERGENCY DEPT VISIT MOD MDM: CPT

## 2025-02-21 PROCEDURE — 96372 THER/PROPH/DIAG INJ SC/IM: CPT

## 2025-02-21 PROCEDURE — A4606 OXYGEN PROBE USED W OXIMETER: HCPCS

## 2025-02-21 PROCEDURE — 90471 IMMUNIZATION ADMIN: CPT

## 2025-02-21 PROCEDURE — A4663 DIALYSIS BLOOD PRESSURE CUFF: HCPCS

## 2025-02-21 RX ADMIN — TETANUS TOXOID, REDUCED DIPHTHERIA TOXOID AND ACELLULAR PERTUSSIS VACCINE, ADSORBED ONE ML: 5; 2.5; 8; 8; 2.5 SUSPENSION INTRAMUSCULAR at 03:29

## 2025-02-21 RX ADMIN — DEXTROSE ONE MG: 50 INJECTION, SOLUTION INTRAVENOUS at 03:28
